# Patient Record
Sex: MALE | Race: WHITE | NOT HISPANIC OR LATINO
[De-identification: names, ages, dates, MRNs, and addresses within clinical notes are randomized per-mention and may not be internally consistent; named-entity substitution may affect disease eponyms.]

---

## 2023-06-06 PROBLEM — Z00.00 ENCOUNTER FOR PREVENTIVE HEALTH EXAMINATION: Status: ACTIVE | Noted: 2023-06-06

## 2023-06-07 ENCOUNTER — APPOINTMENT (OUTPATIENT)
Dept: PAIN MANAGEMENT | Facility: CLINIC | Age: 63
End: 2023-06-07
Payer: MEDICAID

## 2023-06-07 VITALS
BODY MASS INDEX: 30.31 KG/M2 | DIASTOLIC BLOOD PRESSURE: 85 MMHG | SYSTOLIC BLOOD PRESSURE: 155 MMHG | WEIGHT: 200 LBS | OXYGEN SATURATION: 97 % | HEART RATE: 102 BPM | HEIGHT: 68 IN

## 2023-06-07 DIAGNOSIS — I15.9 SECONDARY HYPERTENSION, UNSPECIFIED: ICD-10-CM

## 2023-06-07 DIAGNOSIS — Z87.19 PERSONAL HISTORY OF OTHER DISEASES OF THE DIGESTIVE SYSTEM: ICD-10-CM

## 2023-06-07 DIAGNOSIS — Z86.69 PERSONAL HISTORY OF OTHER DISEASES OF THE NERVOUS SYSTEM AND SENSE ORGANS: ICD-10-CM

## 2023-06-07 DIAGNOSIS — H93.19 TINNITUS, UNSPECIFIED EAR: ICD-10-CM

## 2023-06-07 DIAGNOSIS — Z87.891 PERSONAL HISTORY OF NICOTINE DEPENDENCE: ICD-10-CM

## 2023-06-07 DIAGNOSIS — Z86.79 PERSONAL HISTORY OF OTHER DISEASES OF THE CIRCULATORY SYSTEM: ICD-10-CM

## 2023-06-07 DIAGNOSIS — M79.10 MYALGIA, UNSPECIFIED SITE: ICD-10-CM

## 2023-06-07 DIAGNOSIS — Z87.438 PERSONAL HISTORY OF OTHER DISEASES OF MALE GENITAL ORGANS: ICD-10-CM

## 2023-06-07 DIAGNOSIS — M54.40 LUMBAGO WITH SCIATICA, UNSPECIFIED SIDE: ICD-10-CM

## 2023-06-07 DIAGNOSIS — M48.061 SPINAL STENOSIS, LUMBAR REGION WITHOUT NEUROGENIC CLAUDICATION: ICD-10-CM

## 2023-06-07 PROCEDURE — 99203 OFFICE O/P NEW LOW 30 MIN: CPT

## 2023-06-07 NOTE — ASSESSMENT
[FreeTextEntry1] : Mr. JOSE ANGEL SAWYER is a 63 year M h/o bladder CA suffering from low back pain, sacrococygeal pain , that based upon today's subjective complaints, physical examination, and chart review, is likely multifactorial, probable element of stenosis with element of coccydynia. \par \par >> Medications\par \par Chronic opioid use for non-malignant pain, in particular at high doses would not be recommended since it can potentially lead to hyperalgesia (hypersensitivity), tolerance and addiction. \par  \par Continue with Cyclobenzaprine 10 mgpo bid prn\par \par Ibuprofen 600 mg po bid prn \par  \par >> Interventions\par  \par None indicated at this time, consider for caudal tracy pending imaging \par  \par >> Therapy and Other Modalities\par \par PT 2x/week for 4 weeks for lower back pain, focusing on spinal rehab, strengthening of core, gluts, abductors, LS paraspinals, Quadratus Laborum, stretching, aerobic conditioning, Modalaties as needed, EMPHASIS ON MASSAGE and MYOFASCIAL RELEASE (US, Heat , trial of TENS unit, ROM, stretching hamstrings). \par Precautions: Falls, universal safety precautions \par  \par Continue with daily home stretching regimen\par \par >> Imaging and Other Studies\par \par Obtain MRI of lumbar spine to evaluate for lumbar stenosis and/or disc herniation\par \par Obtain MRI pelvis to evaluate sacrococcygeal area, also rule out malignancy given history of bladder CA\par \par >> Consults\par \par Follow-up with urologist/oncologist as scheduled - consider repeating CBC eval for anemia given pale skin complexion

## 2023-06-07 NOTE — HISTORY OF PRESENT ILLNESS
[8] : 3. What number best describes how, during the past week, pain has interfered with your general activity? 8/10 pain [Back Pain] : back pain [_______] : [unfilled] [___ mths] : [unfilled] month(s) ago [Constant] : constant [5] : an average pain level of 5/10 [4] : a minimum pain level of 4/10 [9] : a maximum pain level of 9/10 [Dull] : dull [Aching] : aching [Throbbing] : throbbing [Burning] : burning [Laying] : laying [Sitting] : sitting [Transitioning] : transitioning [Bending] : bending [Medications] : medications [Heat] : heat [FreeTextEntry1] : HPI - Mr. JOSE ANGEL SAWYER is a 63 year M with PHx Bladder CA s/p cystectomy 3/2023 as below, referred by Dr Tolliver who presents today with chief complaint of low back pain. Reports that it developed several months ago. It is located lumbar spine;  there is radiation of the pain into the buttocks down the legs, LEFT worse than right The pain is presently 8/10 in severity on the numerical rating scale. It is sharp in nature. The pain is not constant. There is diurnal worsening, during the course The pain is aggravated with walking, sitting, bending, lifting. The pain improves with rest. The pain is functionally and emotionally disabling for the patient as its preventing them from going about activities of daily living, such as routine housework.The pain does impair the patient’s ability to sleep. \par \par Patient was NOT  been seen by pain management in the past.\par Patient attests to  6 weeks of provider directed treatment, including a provider directed stretching regimen, naproxem, and cyclobenzaprine\par Patient reports treatment that occurred within the last 3 months\par Patient report that symptoms have been persistent and and progressing after treatment\par  \par Reports there is NO present numbness, there is NO weakness. Patient denies any bowel/bladder incontinence, no saddle/perineal anesthesia or any other red flag signs or symptoms. Reports regular BMs.\par   [FreeTextEntry2] : 24 [FreeTextEntry7] : Patient present with pain in his lower back and tail bone radiating to his legs, he gets restless leg syndrome. referred by Dr. Thomas [FreeTextEntry4] : Alive

## 2023-06-07 NOTE — REVIEW OF SYSTEMS
[Back Pain] : back pain [Muscle Pain] : muscle pain [Radiating Pain] : radiating pain [Joint Stiffness] : joint stiffness [Decreased ROM] : decreased range of motion [Negative] : Heme/Lymph

## 2023-06-07 NOTE — CONSULT LETTER
[Dear  ___] : Dear  [unfilled], [Consult Letter:] : I had the pleasure of evaluating your patient, [unfilled]. [Please see my note below.] : Please see my note below. [Consult Closing:] : Thank you very much for allowing me to participate in the care of this patient.  If you have any questions, please do not hesitate to contact me. [Sincerely,] : Sincerely, [FreeTextEntry3] : Gamaliel Villareal DO

## 2023-06-07 NOTE — PHYSICAL EXAM
[de-identified] : General: Well-developed and well-nourished.  No acute distress.\par Psychiatric: Behavior was cooperative  \par Head:  Normocephalic and atraumatic\par Eyes:  Sclera white. Conjunctiva and eyelids pink and moist without discharge.\par Cardiovascular:  Regular\par Respiratory:  Trachea midline. Normal effort.\par No accessory muscle use with respiration\par Abdomen: Non distended, soft and nontender\par Skin: Pale complextion\par No rashes, ulcers, or lesions appreciated.\par Back  There is pain with extension,   ROM painful \par Extremities: No edema \par Uro: Right abdominal stoma with output\par Musculoskeletal: Moving all extremities freely \par Neuro: CN 2-12 Grossly intact\par Sensation to light touch is intact in all extremities\par Gait: Ambulates with no assistive device.

## 2023-06-12 PROBLEM — Z87.438 HISTORY OF ACUTE PROSTATITIS: Status: RESOLVED | Noted: 2023-06-12 | Resolved: 2023-06-12

## 2023-06-12 PROBLEM — Z87.19 HISTORY OF HEMORRHOIDS: Status: RESOLVED | Noted: 2023-06-12 | Resolved: 2023-06-12

## 2023-06-12 PROBLEM — Z86.69 HISTORY OF TINNITUS: Status: RESOLVED | Noted: 2023-06-12 | Resolved: 2023-06-12

## 2023-06-12 PROBLEM — Z86.79 HISTORY OF HYPERTENSION: Status: RESOLVED | Noted: 2023-06-12 | Resolved: 2023-06-12

## 2023-06-12 PROBLEM — M54.40 ACUTE BACK PAIN WITH SCIATICA: Status: RESOLVED | Noted: 2023-06-12 | Resolved: 2023-06-12

## 2023-06-13 ENCOUNTER — RESULT REVIEW (OUTPATIENT)
Age: 63
End: 2023-06-13

## 2023-06-16 ENCOUNTER — APPOINTMENT (OUTPATIENT)
Dept: PAIN MANAGEMENT | Facility: CLINIC | Age: 63
End: 2023-06-16
Payer: MEDICAID

## 2023-06-16 VITALS
HEART RATE: 96 BPM | SYSTOLIC BLOOD PRESSURE: 164 MMHG | HEIGHT: 68 IN | DIASTOLIC BLOOD PRESSURE: 84 MMHG | OXYGEN SATURATION: 97 % | BODY MASS INDEX: 30.31 KG/M2 | WEIGHT: 200 LBS

## 2023-06-16 DIAGNOSIS — M53.3 SACROCOCCYGEAL DISORDERS, NOT ELSEWHERE CLASSIFIED: ICD-10-CM

## 2023-06-16 PROCEDURE — 99214 OFFICE O/P EST MOD 30 MIN: CPT

## 2023-06-16 RX ORDER — MELOXICAM 7.5 MG/1
7.5 TABLET ORAL
Qty: 15 | Refills: 0 | Status: ACTIVE | COMMUNITY
Start: 2023-06-16 | End: 1900-01-01

## 2023-06-16 NOTE — PHYSICAL EXAM
[de-identified] : General: AAOx3, NAD\par HEENT: EOMI, Sclera white\par CVS: +2 Pulses\par Pulmonary: No Respiratory Distress\par Abdomen: Soft, NT, ND\par MSK: Moving all extremities against gravity\par Neuro: Sensation I to LT\par Extremities: (-)Edema\par Derm: No Rash\par Psych: Calm, Cooperative\par Coccyx- tender to palpation\par Uro: Right abdominal stoma with output

## 2023-06-16 NOTE — ASSESSMENT
[FreeTextEntry1] : Mr. JOSE ANGEL SAWYER is a 63 year M h/o bladder CA suffering from low back pain, sacrococygeal pain , that based upon today's subjective complaints, physical examination, and chart review, is likely multifactorial, probable element of stenosis with element of coccydynia. \par \par >> Medications\par \par Chronic opioid use for non-malignant pain, in particular at high doses would not be recommended since it can potentially lead to hyperalgesia (hypersensitivity), tolerance and addiction. \par  \par Continue with Cyclobenzaprine 10 mgpo bid prn\par \par DC Ibuprofen\par \par Meloxicam 7.5 mg po daily prn\par  \par >> Interventions\par  \par Arrange for ganglion impar block under fluoroscopic guidance.  Risk benefits and alternatives discussed.  All questions answered.\par  \par >> Therapy and Other Modalities\par \par PT 2x/week for 4 weeks for lower back pain, focusing on spinal rehab, strengthening of core, gluts, abductors, LS paraspinals, Quadratus Laborum, stretching, aerobic conditioning, Modalaties as needed, EMPHASIS ON MASSAGE and MYOFASCIAL RELEASE (US, Heat , trial of TENS unit, ROM, stretching hamstrings). \par Precautions: Falls, universal safety precautions \par  \par Continue with daily home stretching regimen\par \par >> Imaging and Other Studies\par \par I have personally reviewed the images in detail together with the patient today, and I have answered all questions regarding this condition to the best of my ability, to the patient's satisfaction. \par >> Consults\par \par Follow-up with urologist/oncologist as scheduled - in setting of soft tissue mass (patient verbalized understanding and agreed to do so) - consider repeating CBC eval for anemia given pale skin complexion

## 2023-06-16 NOTE — DATA REVIEWED
[FreeTextEntry1] : \par PROCEDURE: MRI LUMBAR SPINE \par \par ORDER #: [id number] \par CC: ; ; ; \par End of cc's \par \par CLINICAL INFORMATION: Low back and left leg pain \par \par  TECHNIQUE: Multiplanar, multisequence MRI was performed of the lumbar spine. \par  IV Contrast: NONE \par \par  PRIOR STUDIES: No priors available for comparison. \par \par  FINDINGS: \par \par  LOCALIZER: No additional findings. \par  BONES: There is no fracture or bone marrow edema. \par  ALIGNMENT: The alignment is normal. \par  SACROILIAC JOINTS/SACRUM: There is no sacral fracture. The SI joints are \par  partially visualized but are intact. \par  CONUS AND CAUDA EQUINA: The distal cord and conus are normal in signal. Conus \par  terminates at L1. \par  VISUALIZED INTRAPELVIC/INTRA-ABDOMINAL SOFT TISSUES: Normal. \par  PARASPINAL SOFT TISSUES: Normal. \par \par \par  INDIVIDUAL LEVELS: \par \par  LOWER THORACIC SPINE: No spinal canal or neuroforaminal stenosis. \par \par  L1-L2: Mild facet arthropathy. No spinal canal or neuroforaminal stenosis. \par  L2-L3: Mild to moderate facet arthropathy and mild broad-based posterior disc \par  bulge. No spinal canal or neuroforaminal stenosis. \par  L3-L4: Moderate bilateral facet arthropathy. No spinal canal or neuroforaminal \par  stenosis. \par  L4-L5: Moderate bilateral facet arthropathy and broad-based posterior disc \par  bulge (with superimposed mild right intraforaminal protrusion) result in mild \par  to moderate left neural foraminal narrowing, moderate right neural foraminal \par  narrowing and mild central canal narrowing. \par  L5-S1: Moderate bilateral facet arthropathy and broad-based posterior disc \par  bulge results in mild bilateral neural foraminal narrowing but no significant \par  central canal narrowing. \par \par \par  IMPRESSION: \par \par  Multilevel discogenic degenerative disease and facet arthropathy of the lumbar \par  spine, most pronounced at L4-L5 where there is mild to moderate left neural \par  foraminal narrowing, moderate right neural foraminal narrowing and mild \par  central canal narrowing. There is also mild bilateral neural foraminal \par  narrowing at L5-S1. No additional areas of significant central canal or neural \par  foraminal narrowing within the lumbar spine.\par \par \par \par PROCEDURE: MRI PELVIS \par \par ORDER #: [id number] \par CC: ; ; ; \par End of cc's \par \par \par  History: History of bladder cancer, sacrococcygeal pain \par \par  Technique: Magnetic resonance imaging of the pelvis was performed without \par  intravenous contrast according to standard protocol. \par \par  Comparison: None available \par \par  Findings: \par \par  There is no evidence for acute fracture of the sacrum/coccyx or evidence for \par  marrow replacing process to suggest metastatic disease. \par \par  There is moderate T2 signal hyperintensity within the right adductor \par  musculature, raising suspicion for sequelae of recent strain. No associated \par  drainable hematoma. \par \par  There is nonspecific T2 signal hyperintensity within the intrapelvic soft \par  tissues, possibly reflecting sequelae of infiltrative neoplasm and/or sequelae \par  of posttreatment related changes. Recommend correlation with prior imaging to \par  assess for stability. \par \par  Large field-of-view images of the hips preclude detailed evaluation of the \par  labrum and cartilage. No periarticular cysts are identified. There is no \par  evidence of full-thickness cartilage loss. There is no joint effusion. The \par  gluteus medius and minimus tendons are intact and there is no greater \par  trochanteric bursitis. The hamstring origins are maintained. \par \par  There is no fracture or osteonecrosis. There is no evidence of stress \par  reaction. The sacroiliac joints and pubic symphysis are preserved. \par \par \par  Impression: \par \par \par  There is no evidence for acute fracture of the sacrum/coccyx or evidence for \par  marrow replacing process to suggest metastatic disease. \par \par  There is moderate T2 signal hyperintensity within the right adductor \par  musculature, raising suspicion for sequelae of recent strain. No associated \par  drainable hematoma. \par \par  There is nonspecific T2 signal hyperintensity within the intrapelvic soft \par  tissues, possibly reflecting sequelae of infiltrative neoplasm and/or sequelae \par  of posttreatment related changes. Recommend correlation with prior imaging to \par  assess for stability.

## 2023-06-16 NOTE — HISTORY OF PRESENT ILLNESS
[9] : a current pain level of 9/10 [FreeTextEntry1] : Interval Note:\par \par Since last visit the pain intensity has did, particularly in the coccygeal region , there is pain with sitting\par \par Recent MRI series completed for review\par \par Moving bowels regularly. No recent falls. \par \par Patient denies any bowel/bladder incontinence, no saddle/perineal anesthesia or any other red flag signs or symptoms. \par \par \par \par ---\par \par \par HPI - Mr. JOSE ANGEL SAWYER is a 63 year M with PHx Bladder CA s/p cystectomy 3/2023 as below, referred by Dr Tolliver who presents today with chief complaint of low back pain. Reports that it developed several months ago. It is located lumbar spine;  there is radiation of the pain into the buttocks down the legs, LEFT worse than right The pain is presently 8/10 in severity on the numerical rating scale. It is sharp in nature. The pain is not constant. There is diurnal worsening, during the course The pain is aggravated with walking, sitting, bending, lifting. The pain improves with rest. The pain is functionally and emotionally disabling for the patient as its preventing them from going about activities of daily living, such as routine housework.The pain does impair the patient’s ability to sleep. \par \par Patient was NOT  been seen by pain management in the past.\par Patient attests to  6 weeks of provider directed treatment, including a provider directed stretching regimen, naproxem, and cyclobenzaprine\par Patient reports treatment that occurred within the last 3 months\par Patient report that symptoms have been persistent and and progressing after treatment\par \par Dr Siddharth Irving - Urologic Surgical Oncologist\par  \par Reports there is NO present numbness, there is NO weakness. Patient denies any bowel/bladder incontinence, no saddle/perineal anesthesia or any other red flag signs or symptoms. Reports regular BMs.\par

## 2023-06-22 ENCOUNTER — APPOINTMENT (OUTPATIENT)
Dept: PAIN MANAGEMENT | Facility: HOSPITAL | Age: 63
End: 2023-06-22